# Patient Record
Sex: MALE
[De-identification: names, ages, dates, MRNs, and addresses within clinical notes are randomized per-mention and may not be internally consistent; named-entity substitution may affect disease eponyms.]

---

## 2018-02-07 ENCOUNTER — HOSPITAL ENCOUNTER (EMERGENCY)
Dept: HOSPITAL 31 - C.ER | Age: 53
Discharge: HOME | End: 2018-02-07
Payer: SELF-PAY

## 2018-02-07 VITALS
OXYGEN SATURATION: 98 % | DIASTOLIC BLOOD PRESSURE: 95 MMHG | SYSTOLIC BLOOD PRESSURE: 150 MMHG | TEMPERATURE: 98.1 F | RESPIRATION RATE: 18 BRPM | HEART RATE: 89 BPM

## 2018-02-07 VITALS — BODY MASS INDEX: 25.8 KG/M2
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
## 2018-02-07 DIAGNOSIS — S46.911A: Primary | ICD-10-CM
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
## 2018-02-07 NOTE — C.PDOC
Time Seen by Provider: 02/07/18 18:06


Chief Complaint (Nursing): Upper Extremity Problem/Injury





Past Medical History


Vital Signs: 





 Last Vital Signs











Temp  98.1 F   02/07/18 17:44


 


Pulse  89   02/07/18 17:44


 


Resp  18   02/07/18 17:44


 


BP  150/95 H  02/07/18 17:44


 


Pulse Ox  98   02/07/18 17:44














- Medical History


PMH: Anxiety, Bipolar Disorder, Bronchitis (chronic), Depression





- [facility] Procedures











DEBRID OPN FX-FINGER (06/19/01)


FINGER AMPUTATION (01/08/04)


HAND JOINT STRUCT DIVIS (04/01/02)


OPEN RED-INT FIX FINGER (06/19/01)


PENILE OPERATION NEC (05/13/14)








Family History: States: Unknown Family Hx





- Social History


Hx Tobacco Use: No


Hx Alcohol Use: Yes


Hx Substance Use: Yes (LAST USED 5 DAYS AGO)





- Immunization History


Hx Tetanus Toxoid Vaccination: No


Hx Influenza Vaccination: No


Hx Pneumococcal Vaccination: No





ED Course And Treatment


O2 Sat by Pulse Oximetry: 98





Disposition


Counseled Patient/Family Regarding: Diagnosis, Need For Followup, Rx Given





- Disposition


Referrals: 


[facility] at [facility] [Outside]


Disposition: HOME/ ROUTINE


Disposition Time: 18:58


Condition: STABLE


Additional Instructions: 


Light duty to Right arm


take medication as prescribed


Follow up with PMD and Ortho in 2-3 days for re-evaluation.


return to ED if any worsening or new changes.


Prescriptions: 


Ibuprofen [Motrin Tab] 600 mg PO Q6 #14 tab


traMADol [Ultram] 50 mg PO TID #7 tab


Instructions:  Shoulder Sprain (ED), Elbow Sprain (ED)


Forms:  [facility] (English), Work Excuse





- Clinical Impression


Clinical Impression: 


 Shoulder strain, Elbow strain

## 2018-02-07 NOTE — C.PDOC
History Of Present Illness


52-year-old male, presents to the emergency department for evaluation of right 

upper arm and shoulder pain gradually developed over the past 3-4 days. 

Patients arm got stuck in truck and was manipulated to get out. Pain is worse 

with movement, otherwise no deformity or skin changes.


Time Seen by Provider: 02/07/18 18:06


Chief Complaint (Nursing): Upper Extremity Problem/Injury


History Per: Patient


History/Exam Limitations: no limitations


Onset/Duration Of Symptoms: Days


Current Symptoms Are (Timing): Still Present





Past Medical History


Reviewed: Historical Data, Nursing Documentation, Vital Signs


Vital Signs: 


 Last Vital Signs











Temp  98.1 F   02/07/18 17:44


 


Pulse  89   02/07/18 17:44


 


Resp  18   02/07/18 17:44


 


BP  150/95 H  02/07/18 17:44


 


Pulse Ox  98   02/07/18 19:23














- Medical History


PMH: Anxiety, Bipolar Disorder, Bronchitis (chronic), Depression





- [facility] Procedures








DEBRID OPN FX-FINGER (06/19/01)


FINGER AMPUTATION (01/08/04)


HAND JOINT STRUCT DIVIS (04/01/02)


OPEN RED-INT FIX FINGER (06/19/01)


PENILE OPERATION NEC (05/13/14)








Family History: States: No Known Family Hx





- Social History


Hx Tobacco Use: No


Hx Alcohol Use: Yes


Hx Substance Use: Yes (LAST USED 5 DAYS AGO)





- Immunization History


Hx Tetanus Toxoid Vaccination: No


Hx Influenza Vaccination: No


Hx Pneumococcal Vaccination: No





Review Of Systems


Constitutional: Negative for: Fever, Chills


Cardiovascular: Negative for: Chest Pain, Palpitations


Respiratory: Negative for: Shortness of Breath


Gastrointestinal: Negative for: Vomiting


Musculoskeletal: Positive for: Arm Pain


Neurological: Negative for: Weakness, Numbness





Physical Exam





- Physical Exam


Appears: Non-toxic, No Acute Distress


Skin: Warm, Dry, No Rash


Head: Normacephalic


Neck: Normal ROM


Respiratory: No Accessory Muscle Use


Extremity: Tenderness (superior aspect of right shoulder over the right 

proximal humerus), No Deformity, No Swelling


Neurological/Psych: Oriented x3, Normal Speech, Normal Motor, Normal Sensation





ED Course And Treatment


O2 Sat by Pulse Oximetry: 98 (RA)


Pulse Ox Interpretation: Normal





Disposition





- Disposition


Referrals: 


[facility] at [facility] [Outside]


Disposition: HOME/ ROUTINE


Disposition Time: 18:58


Condition: STABLE


Additional Instructions: 


Light duty to Right arm


take medication as prescribed


Follow up with PMD and Ortho in 2-3 days for re-evaluation.


return to ED if any worsening or new changes.


Prescriptions: 


Ibuprofen [Motrin Tab] 600 mg PO Q6 #14 tab


traMADol [Ultram] 50 mg PO TID #7 tab


Instructions:  Elbow Sprain (ED), Shoulder Sprain (ED)


Forms:  [facility] (English), Work Excuse





- Clinical Impression


Clinical Impression: 


 Shoulder strain, Elbow strain








- Scribe Statement


The provider has reviewed the documentation as recorded by the Scribe (Bobby Esteban)








All medical record entries made by the Scribe were at my direction and 

personally dictated by me. I have reviewed the chart and agree that the record 

accurately reflects my personal performance of the history, physical exam, 

medical decision making, and the department course for this patient. I have 

also personally directed, reviewed, and agree with the discharge instructions 

and disposition.

## 2018-02-08 NOTE — RAD
PROCEDURE:  Radiographs of the right humerus.



HISTORY:

injury



COMPARISON:

None.



FINDINGS:



BONES:

. No fracture or focal lesion. 



SOFT TISSUES:

Normal. Sign 



OTHER FINDINGS:

Acromioclavicular and lesser glenohumeral mild arthrosis present



IMPRESSION:

No fracture or dislocation. Mild arthrosis

## 2018-02-08 NOTE — RAD
PROCEDURE:  Radiographs of the Right Shoulder



HISTORY:

injury







COMPARISON:

No prior.



FINDINGS:



BONES:

. No fracture.



JOINTS:

. Glenohumeral mild and acromioclavicular moderate osteoarthritis.



SOFT TISSUES:

Normal.



OTHER FINDINGS:

None.



IMPRESSION:

No fracture or dislocation. 



Right shoulder arthrosis.

## 2018-02-08 NOTE — RAD
PROCEDURE:  Radiographs of the right elbow.



HISTORY:

injury  



COMPARISON:

No prior.



FINDINGS:



BONES:

. No fracture. Lateral humeral epicondylar bony excrescence and/or 

calcific tendinosis/calcific ligamentous changes.  Small posterior 

olecranon spurring 



JOINTS:

Ulnar humeral joint most notable osteoarthritis. Nearby punctate sub 

mm loose body possible 



SOFT TISSUES:

Normal.



JOINT EFFUSION:

None.



OTHER FINDINGS:

None.



IMPRESSION:

No fracture or dislocation. No elbow joint effusion appreciated 



Ulna humeral cystic and hypertrophic arthrosis with sub mm ossific 

debris/loose body probable punctate calcific tendinosis here another 

consideration. 



Lateral humeral epicondylar bony excrescence/exuberant calcification 

-appears can be seen in a clinical lateral epicondylitis. Calcific 

tendinopathy with osseous changes  from biomechanical stresses here 

favored

## 2018-08-24 ENCOUNTER — HOSPITAL ENCOUNTER (EMERGENCY)
Dept: HOSPITAL 31 - C.ER | Age: 53
Discharge: HOME | End: 2018-08-24
Payer: COMMERCIAL

## 2018-08-24 VITALS
TEMPERATURE: 97.8 F | OXYGEN SATURATION: 99 % | HEART RATE: 65 BPM | SYSTOLIC BLOOD PRESSURE: 131 MMHG | DIASTOLIC BLOOD PRESSURE: 82 MMHG

## 2018-08-24 VITALS — BODY MASS INDEX: 25.1 KG/M2

## 2018-08-24 VITALS — RESPIRATION RATE: 18 BRPM

## 2018-08-24 DIAGNOSIS — S46.912A: Primary | ICD-10-CM
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
## 2018-08-24 DIAGNOSIS — S43.402A: ICD-10-CM

## 2018-08-24 NOTE — C.PDOC
History Of Present Illness


51 y/o male presents to ED with c/o left shoulder pain for 3 days developed 

after twisting in bed. Patient states pain radiates to neck and reports prior 

episode in 2/2018, came to ED and was discharged. Patient denies chest pain, sob

, nausea, vomiting or any other complaints at this time. Patient is right hand 

dominant. 


Time Seen by Provider: 08/24/18 08:34


Chief Complaint (Nursing): Upper Extremity Problem/Injury


History Per: Patient


History/Exam Limitations: no limitations


Onset/Duration Of Symptoms: Days


Current Symptoms Are (Timing): Still Present





Past Medical History


Reviewed: Historical Data, Nursing Documentation, Vital Signs


Vital Signs: 


 Last Vital Signs











Temp  97.7 F   08/24/18 08:16


 


Pulse  92 H  08/24/18 08:16


 


Resp  18   08/24/18 08:16


 


BP  138/100 H  08/24/18 08:16


 


Pulse Ox  100   08/24/18 09:40














- Medical History


PMH: Anxiety, Bipolar Disorder, Bronchitis (chronic), Depression


Surgical History: No Surg Hx





- [facility] Procedures








DEBRID OPN FX-FINGER (06/19/01)


FINGER AMPUTATION (01/08/04)


HAND JOINT STRUCT DIVIS (04/01/02)


OPEN RED-INT FIX FINGER (06/19/01)


PENILE OPERATION NEC (05/13/14)








Family History: States: No Known Family Hx





- Social History


Hx Tobacco Use: No


Hx Alcohol Use: Yes


Hx Substance Use: Yes (LAST USED 5 DAYS AGO)





- Immunization History


Hx Tetanus Toxoid Vaccination: Yes


Hx Influenza Vaccination: No


Hx Pneumococcal Vaccination: No





Review Of Systems


Except As Marked, All Systems Reviewed And Found Negative.


Musculoskeletal: Positive for: Neck Pain, Shoulder Pain





Physical Exam





- Physical Exam


Appears: Non-toxic, No Acute Distress


Skin: Warm, Dry, No Rash


Head: Atraumatic, Normacephalic


Eye(s): bilateral: Normal Inspection


Oral Mucosa: Moist


Neck: Supple


Cardiovascular: Rhythm Regular


Respiratory: Normal Breath Sounds, No Rales, No Rhonchi, No Wheezing


Gastrointestinal/Abdominal: Soft, No Tenderness, No Guarding, No Rebound


Extremity: Tenderness (left anterior and posterior shoulder), Capillary Refill (

<2 seconds), No Deformity


Extremity: Left: Limited ROM To Joint (left shoulder secondary to pain)


Neurological/Psych: Oriented x3, Normal Motor, Normal Sensation





ED Course And Treatment


O2 Sat by Pulse Oximetry: 100 (RA)


Pulse Ox Interpretation: Normal





Medical Decision Making


Medical Decision Making: 


Assessment: Shoulder pain








Progress:


Patient refused EKG, states he does not have chest pain





Patient d/c with follow up to Dr. Daniels in 2 days. 





Disposition





- Disposition


Referrals: 


Faizan Daniels III, MD [Staff Provider] - 


Disposition: HOME/ ROUTINE


Disposition Time: 09:28


Condition: STABLE


Additional Instructions: 


follow up with orthopaedic within 2 days


call to make an appointment


take medications as needed for pain


return to ER if symptoms worsens or progress 


Prescriptions: 


Cyclobenzaprine [Cyclobenzaprine HCl] 10 mg PO TID PRN #14 tab


 PRN Reason: Other


Lidocaine 5% [Lidoderm] 1 ea TD DAILY PRN #10 patch


 PRN Reason: Pain, Moderate (4-7)


Instructions:  Shoulder Pain (DC)


Forms:  General Discharge Instructions, [facility] (English), Work Excuse





- Clinical Impression


Clinical Impression: 


 Muscle strain, Sprain








- Scribe Statement


The provider has reviewed the documentation as recorded by the Shavonibaryan Phipps





All medical record entries made by the Shavonibaryan were at my direction and 

personally dictated by me. I have reviewed the chart and agree that the record 

accurately reflects my personal performance of the history, physical exam, 

medical decision making, and the department course for this patient. I have 

also personally directed, reviewed, and agree with the discharge instructions 

and disposition.

## 2018-08-24 NOTE — RAD
Date of service: 



08/24/2018



PROCEDURE:  Radiographs of the Left Shoulder



HISTORY:

fall







COMPARISON:

No prior.



FINDINGS:



BONES:

No evidence of acute displaced fracture nor dislocation.



JOINTS:

Mild degenerative osteoarthritis left acromioclavicular and left 

glenohumeral joints.



SOFT TISSUES:

Normal.



OTHER FINDINGS:

None. 



IMPRESSION:

Normal radiographs of the left shoulder. Mild DJD as described

## 2019-01-16 ENCOUNTER — HOSPITAL ENCOUNTER (EMERGENCY)
Dept: HOSPITAL 31 - C.ER | Age: 54
Discharge: HOME | End: 2019-01-16
Payer: SELF-PAY

## 2019-01-16 VITALS — RESPIRATION RATE: 23 BRPM | SYSTOLIC BLOOD PRESSURE: 141 MMHG | HEART RATE: 80 BPM | DIASTOLIC BLOOD PRESSURE: 90 MMHG

## 2019-01-16 VITALS — BODY MASS INDEX: 25.1 KG/M2

## 2019-01-16 VITALS — TEMPERATURE: 98.1 F
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
## 2019-01-16 DIAGNOSIS — S20.211A: Primary | ICD-10-CM
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
## 2019-01-16 PROCEDURE — 71101 X-RAY EXAM UNILAT RIBS/CHEST: CPT

## 2019-01-16 PROCEDURE — 99285 EMERGENCY DEPT VISIT HI MDM: CPT

## 2019-01-16 PROCEDURE — 96372 THER/PROPH/DIAG INJ SC/IM: CPT

## 2019-01-16 NOTE — RAD
Date of service: 



01/16/2019



PROCEDURE:  Radiographs of the Chest and Right Ribs.



HISTORY:

TRAUMA



COMPARISON:

Chest radiograph 04/07/2014. 



TECHNIQUE:

Frontal radiograph of the chest and multiple oblique radiographs of 

the right ribs were obtained.



FINDINGS:



RIGHT RIBS:

No fracture or focal lesion visualized.



LUNGS:

No acute pulmonary disease appreciable bilaterally.



PLEURA:

No pneumothorax or pleural fluid.



CARDIOVASCULAR:

Normal cardiac size. No pulmonary vascular congestion. 



No aortic atherosclerotic calcification present



OTHER FINDINGS:

None.



IMPRESSION:

Unremarkable, stable radiographs of the chest and right ribs. No 

right rib fracture. 



Findings discussed with Dr. Wade with written down and read back 

verification 01/16/2019 2:20 p.m..

## 2019-01-16 NOTE — C.PDOC
History Of Present Illness





54 y/o male presents to ED complaining of right chest pain after a fall last 

night. Patient states that he was taking a shower last night when the water 

suddenly got hot and he tried to jump out of the tub. Patient fell on the tub, 

striking the right side of his chest wall. Denies LOC, head injury, neck pain, 

nausea, vomiting, chest pain, SOB, or abdominal pain. 








- HPI


Time Seen by Provider: 01/16/19 11:12


Chief Complaint (Nursing): Rib Injury


History Per: Patient


History/Exam Limitations: no limitations


Onset/Duration Of Symptoms: Days





Past Medical History


Reviewed: Historical Data, Nursing Documentation, Vital Signs


Vital Signs: 





                                Last Vital Signs











Temp  98.1 F   01/16/19 11:33


 


Pulse  77   01/16/19 11:33


 


Resp  24   01/16/19 11:33


 


BP  114/90   01/16/19 11:33


 


Pulse Ox  98   01/16/19 11:33














- Medical History


PMH: Anxiety, Bipolar Disorder, Bronchitis (chronic), Depression





- [facility] Procedures











DEBRID OPN FX-FINGER (06/19/01)


FINGER AMPUTATION (01/08/04)


HAND JOINT STRUCT DIVIS (04/01/02)


OPEN RED-INT FIX FINGER (06/19/01)


PENILE OPERATION NEC (05/13/14)








Family History: States: No Known Family Hx





- Social History


Hx Tobacco Use: No


Hx Alcohol Use: Yes


Hx Substance Use: No





- Immunization History


Hx Tetanus Toxoid Vaccination: Yes


Hx Influenza Vaccination: No


Hx Pneumococcal Vaccination: Yes





Review Of Systems


Except As Marked, All Systems Reviewed And Found Negative.


Constitutional: Negative for: Fever, Chills


Cardiovascular: Negative for: Chest Pain


Respiratory: Negative for: Shortness of Breath


Gastrointestinal: Negative for: Nausea, Vomiting, Abdominal Pain


Musculoskeletal: Positive for: Other (Right chest wall pain).  Negative for: Nec

k Pain


Neurological: Negative for: Other (LOC)





Physical Exam





- Physical Exam


Appears: Non-toxic, No Acute Distress


Skin: Warm, Dry


Head: Atraumatic, Normacephalic


Eye(s): bilateral: Normal Inspection


Oral Mucosa: Moist


Neck: Normal ROM, Supple


Chest: Symmetrical


Cardiovascular: Rhythm Regular, No Murmur


Respiratory: Normal Breath Sounds, No Rales, No Rhonchi, No Wheezing


Gastrointestinal/Abdominal: Soft, No Tenderness


Extremity: Tenderness (in right lateral rib cage), No Deformity, No Swelling


Extremity: Bilateral: Normal Color And Temperature


Neurological/Psych: Oriented x3, Normal Speech





ED Course And Treatment


O2 Sat by Pulse Oximetry: 98 (RA)


Pulse Ox Interpretation: Normal





- Other Rad


  ** Ribs XR


X-Ray: Read By Radiologist


Interpretation: FINDINGS:  RIGHT RIBS:  No fracture or focal lesion visualized. 

LUNGS:  No acute pulmonary disease appreciable bilaterally.  PLEURA:  No 

pneumothorax or pleural fluid.  CARDIOVASCULAR:  Normal cardiac size. No 

pulmonary vascular congestion.  No aortic atherosclerotic calcification present.

 OTHER FINDINGS:  None.  IMPRESSION:  Unremarkable, stable radiographs of the 

chest and right ribs. No right rib fracture.





Medical Decision Making


Medical Decision Making: 





Plan:


--Right rib XR





12:15 -- Patient reports pain improved since applying ice on top of injured 

area. 





Disposition


Counseled Patient/Family Regarding: Studies Performed, Diagnosis, Need For 

Followup





- Disposition


Referrals: 


[facility] [Outside]


[facility] [Outside]


Disposition: HOME/ ROUTINE


Disposition Time: 14:27


Condition: STABLE


Prescriptions: 


Cyclobenzaprine [Flexeril] 5 mg PO TID PRN #15 tab


 PRN Reason: Muscle Spasm


RX: Ibuprofen [Motrin Tab] 800 mg PO TID PRN #30 tab


 PRN Reason: Pain, Moderate (4-7)


Instructions:  Bruised Rib (DC)


Forms:  General Discharge Instructions, Work/School/Gym Excuse, [facility] 

[facility] (English)





- POA


Present On Arrival: None





- Clinical Impression


Clinical Impression: 


 Contusion of rib on right side








- Scribe Statement


The provider has reviewed the documentation as recorded by the Milo Navarro





Provider Attestation: 





All medical record entries made by the Milo were at my direction and 

personally dictated by me. I have reviewed the chart and agree that the record 

accurately reflects my personal performance of the history, physical exam, 

medical decision making, and the department course for this patient. I have also

personally directed, reviewed, and agree with the discharge instructions and 

disposition.

## 2019-01-20 VITALS — OXYGEN SATURATION: 98 %
